# Patient Record
(demographics unavailable — no encounter records)

---

## 2024-12-04 NOTE — ASSESSMENT
[FreeTextEntry1] : ===================================== # hyponatremia    improved with FR   # Elevated scr  - fu trend as intake has changed   # weight loss - seen by GI recently and improved with PPI - weight stabilizing  - s/p egd and now await colonscopy

## 2024-12-04 NOTE — RESULTS/DATA
[TextEntry] : 10/24/24 hgb 9 bun/cr 28/1.36 na 135  5/21/24 ( no recent labs due to multiple cancellations) ======================= 10/16/23 na 136 10/26/23 133 UA 1.009 sg ph 6.5 no bld protein  tsh 2.9  hgb 9.6  3/7/24 hgb 10.7  na 132

## 2024-12-04 NOTE — PHYSICAL EXAM
[General Appearance - Alert] : alert [General Appearance - In No Acute Distress] : in no acute distress [Sclera] : the sclera and conjunctiva were normal [Outer Ear] : the ears and nose were normal in appearance [Neck Appearance] : the appearance of the neck was normal [] : no respiratory distress [Respiration, Rhythm And Depth] : normal respiratory rhythm and effort [Auscultation Breath Sounds / Voice Sounds] : lungs were clear to auscultation bilaterally [Heart Rate And Rhythm] : heart rate was normal and rhythm regular [Heart Sounds] : normal S1 and S2 [Murmurs] : no murmurs [Edema] : there was no peripheral edema [Involuntary Movements] : no involuntary movements were seen [No Focal Deficits] : no focal deficits [Oriented To Time, Place, And Person] : oriented to person, place, and time

## 2024-12-04 NOTE — REVIEW OF SYSTEMS
[As Noted in HPI] : as noted in HPI [Negative] : Heme/Lymph [de-identified] : RLS recently treated

## 2024-12-04 NOTE — HISTORY OF PRESENT ILLNESS
[FreeTextEntry1] : weight stabilized colonscopy 12/10 to be done  egd neg  anemia continues  has been dec her intake of liquid

## 2025-06-09 NOTE — ASSESSMENT
[FreeTextEntry1] : ===================================== # hyponatremia    improved with FR   # hyperkalemia - does not consume high k foods  - will repeat today   # Elevated scr  - fu trend as intake has changed   # weight loss - seen by GI recently and improved with PPI - weight stabilizing  - s/p egd and now await colonscopy  if labs remain stable, will plan to fu prn  labs to be discussed with DIL

## 2025-06-09 NOTE — REVIEW OF SYSTEMS
[As Noted in HPI] : as noted in HPI [Negative] : Heme/Lymph [de-identified] : RLS recently treated

## 2025-06-09 NOTE — PHYSICAL EXAM
[General Appearance - Alert] : alert [General Appearance - In No Acute Distress] : in no acute distress [Outer Ear] : the ears and nose were normal in appearance [Sclera] : the sclera and conjunctiva were normal [Neck Appearance] : the appearance of the neck was normal [] : no respiratory distress [Respiration, Rhythm And Depth] : normal respiratory rhythm and effort [Heart Rate And Rhythm] : heart rate was normal and rhythm regular [Auscultation Breath Sounds / Voice Sounds] : lungs were clear to auscultation bilaterally [Heart Sounds] : normal S1 and S2 [Murmurs] : no murmurs [Edema] : there was no peripheral edema [Involuntary Movements] : no involuntary movements were seen [No Focal Deficits] : no focal deficits [Oriented To Time, Place, And Person] : oriented to person, place, and time

## 2025-06-09 NOTE — REVIEW OF SYSTEMS
[As Noted in HPI] : as noted in HPI [Negative] : Heme/Lymph [de-identified] : RLS recently treated

## 2025-06-09 NOTE — REASON FOR VISIT
[Follow-Up] : a follow-up visit [Consultation] : a consultation visit [Spouse] : spouse [FreeTextEntry1] : hyponatremia

## 2025-06-09 NOTE — PHYSICAL EXAM
[General Appearance - Alert] : alert [Sclera] : the sclera and conjunctiva were normal [General Appearance - In No Acute Distress] : in no acute distress [Outer Ear] : the ears and nose were normal in appearance [Neck Appearance] : the appearance of the neck was normal [] : no respiratory distress [Heart Rate And Rhythm] : heart rate was normal and rhythm regular [Auscultation Breath Sounds / Voice Sounds] : lungs were clear to auscultation bilaterally [Respiration, Rhythm And Depth] : normal respiratory rhythm and effort [Murmurs] : no murmurs [Heart Sounds] : normal S1 and S2 [Edema] : there was no peripheral edema [Involuntary Movements] : no involuntary movements were seen [No Focal Deficits] : no focal deficits [Oriented To Time, Place, And Person] : oriented to person, place, and time